# Patient Record
Sex: FEMALE | Race: WHITE | NOT HISPANIC OR LATINO | Employment: FULL TIME | URBAN - NONMETROPOLITAN AREA
[De-identification: names, ages, dates, MRNs, and addresses within clinical notes are randomized per-mention and may not be internally consistent; named-entity substitution may affect disease eponyms.]

---

## 2017-06-15 ENCOUNTER — TRANSCRIBE ORDERS (OUTPATIENT)
Dept: ADMINISTRATIVE | Facility: HOSPITAL | Age: 42
End: 2017-06-15

## 2017-06-15 DIAGNOSIS — N63.0 BREAST LUMP: Primary | ICD-10-CM

## 2017-06-21 ENCOUNTER — HOSPITAL ENCOUNTER (OUTPATIENT)
Dept: MAMMOGRAPHY | Facility: HOSPITAL | Age: 42
Discharge: HOME | End: 2017-06-21

## 2017-06-21 ENCOUNTER — HOSPITAL ENCOUNTER (OUTPATIENT)
Dept: ULTRASOUND IMAGING | Facility: HOSPITAL | Age: 42
Discharge: HOME | End: 2017-06-21
Admitting: NURSE PRACTITIONER

## 2017-06-21 DIAGNOSIS — N63.0 BREAST LUMP: ICD-10-CM

## 2017-06-21 PROCEDURE — 77066 DX MAMMO INCL CAD BI: CPT | Performed by: RADIOLOGY

## 2017-06-21 PROCEDURE — 76642 ULTRASOUND BREAST LIMITED: CPT | Performed by: RADIOLOGY

## 2017-06-21 PROCEDURE — G0204 DX MAMMO INCL CAD BI: HCPCS

## 2017-06-21 PROCEDURE — G0279 TOMOSYNTHESIS, MAMMO: HCPCS

## 2017-06-21 PROCEDURE — 77062 BREAST TOMOSYNTHESIS BI: CPT | Performed by: RADIOLOGY

## 2017-06-21 PROCEDURE — 76642 ULTRASOUND BREAST LIMITED: CPT

## 2017-06-26 ENCOUNTER — TELEPHONE (OUTPATIENT)
Dept: MAMMOGRAPHY | Facility: HOSPITAL | Age: 42
End: 2017-06-26

## 2017-06-28 ENCOUNTER — APPOINTMENT (OUTPATIENT)
Dept: ULTRASOUND IMAGING | Facility: HOSPITAL | Age: 42
End: 2017-06-28

## 2017-06-29 ENCOUNTER — HOSPITAL ENCOUNTER (OUTPATIENT)
Dept: ULTRASOUND IMAGING | Facility: HOSPITAL | Age: 42
Discharge: HOME | End: 2017-06-29
Admitting: RADIOLOGY

## 2017-06-29 DIAGNOSIS — N60.02 CYST (SOLITARY) OF BREAST, LEFT: ICD-10-CM

## 2017-06-29 PROCEDURE — 19000 PUNCTURE ASPIR CYST BREAST: CPT | Performed by: RADIOLOGY

## 2017-06-29 PROCEDURE — 76942 ECHO GUIDE FOR BIOPSY: CPT

## 2017-06-29 PROCEDURE — 76942 ECHO GUIDE FOR BIOPSY: CPT | Performed by: RADIOLOGY

## 2017-06-30 LAB
LAB AP CASE REPORT: NORMAL
Lab: NORMAL

## 2017-07-05 ENCOUNTER — TELEPHONE (OUTPATIENT)
Dept: MAMMOGRAPHY | Facility: HOSPITAL | Age: 42
End: 2017-07-05

## 2018-06-20 ENCOUNTER — HOSPITAL ENCOUNTER (OUTPATIENT)
Dept: MAMMOGRAPHY | Facility: HOSPITAL | Age: 43
Discharge: HOME | End: 2018-06-20
Admitting: FAMILY MEDICINE

## 2018-06-20 DIAGNOSIS — Z12.31 SCREENING MAMMOGRAM, ENCOUNTER FOR: ICD-10-CM

## 2018-06-20 PROCEDURE — 77063 BREAST TOMOSYNTHESIS BI: CPT

## 2018-06-20 PROCEDURE — 77067 SCR MAMMO BI INCL CAD: CPT

## 2018-06-20 PROCEDURE — 77067 SCR MAMMO BI INCL CAD: CPT | Performed by: RADIOLOGY

## 2018-06-20 PROCEDURE — 77063 BREAST TOMOSYNTHESIS BI: CPT | Performed by: RADIOLOGY

## 2019-10-24 ENCOUNTER — HOSPITAL ENCOUNTER (OUTPATIENT)
Dept: MAMMOGRAPHY | Facility: HOSPITAL | Age: 44
Discharge: HOME | End: 2019-10-24
Admitting: FAMILY MEDICINE

## 2019-10-24 DIAGNOSIS — Z12.31 SCREENING MAMMOGRAM FOR HIGH-RISK PATIENT: ICD-10-CM

## 2019-10-24 PROCEDURE — 77067 SCR MAMMO BI INCL CAD: CPT

## 2019-10-24 PROCEDURE — 77063 BREAST TOMOSYNTHESIS BI: CPT | Performed by: RADIOLOGY

## 2019-10-24 PROCEDURE — 77067 SCR MAMMO BI INCL CAD: CPT | Performed by: RADIOLOGY

## 2019-10-24 PROCEDURE — 77063 BREAST TOMOSYNTHESIS BI: CPT

## 2020-12-30 ENCOUNTER — IMMUNIZATION (OUTPATIENT)
Dept: VACCINE CLINIC | Facility: HOSPITAL | Age: 45
End: 2020-12-30

## 2020-12-30 PROCEDURE — 91300 HC SARSCOV02 VAC 30MCG/0.3ML IM: CPT | Performed by: FAMILY MEDICINE

## 2020-12-30 PROCEDURE — 0001A: CPT | Performed by: FAMILY MEDICINE

## 2021-01-20 ENCOUNTER — IMMUNIZATION (OUTPATIENT)
Dept: VACCINE CLINIC | Facility: HOSPITAL | Age: 46
End: 2021-01-20

## 2021-01-20 PROCEDURE — 91300 HC SARSCOV02 VAC 30MCG/0.3ML IM: CPT | Performed by: FAMILY MEDICINE

## 2021-01-20 PROCEDURE — 0002A: CPT | Performed by: FAMILY MEDICINE

## 2021-10-29 ENCOUNTER — TRANSCRIBE ORDERS (OUTPATIENT)
Dept: OTHER | Facility: OTHER | Age: 46
End: 2021-10-29

## 2021-10-29 ENCOUNTER — LAB (OUTPATIENT)
Dept: LAB | Facility: HOSPITAL | Age: 46
End: 2021-10-29

## 2021-10-29 DIAGNOSIS — Z01.419 ROUTINE GYNECOLOGICAL EXAMINATION: ICD-10-CM

## 2021-10-29 DIAGNOSIS — Z01.419 ROUTINE GYNECOLOGICAL EXAMINATION: Primary | ICD-10-CM

## 2021-10-29 DIAGNOSIS — R53.83 TIREDNESS: ICD-10-CM

## 2021-10-29 LAB
25(OH)D3 SERPL-MCNC: 45.7 NG/ML
ALBUMIN SERPL-MCNC: 4.4 G/DL (ref 3.5–5.2)
ALBUMIN/GLOB SERPL: 1.5 G/DL
ALP SERPL-CCNC: 44 U/L (ref 39–117)
ALT SERPL W P-5'-P-CCNC: 10 U/L (ref 1–33)
ANION GAP SERPL CALCULATED.3IONS-SCNC: 11.3 MMOL/L (ref 5–15)
AST SERPL-CCNC: 15 U/L (ref 1–32)
BASOPHILS # BLD AUTO: 0.03 10*3/MM3 (ref 0–0.2)
BASOPHILS NFR BLD AUTO: 0.4 % (ref 0–1.5)
BILIRUB SERPL-MCNC: 0.2 MG/DL (ref 0–1.2)
BUN SERPL-MCNC: 13 MG/DL (ref 6–20)
BUN/CREAT SERPL: 13.7 (ref 7–25)
CALCIUM SPEC-SCNC: 9 MG/DL (ref 8.6–10.5)
CHLORIDE SERPL-SCNC: 104 MMOL/L (ref 98–107)
CHOLEST SERPL-MCNC: 206 MG/DL (ref 0–200)
CO2 SERPL-SCNC: 22.7 MMOL/L (ref 22–29)
CREAT SERPL-MCNC: 0.95 MG/DL (ref 0.57–1)
DEPRECATED RDW RBC AUTO: 38 FL (ref 37–54)
EOSINOPHIL # BLD AUTO: 0.11 10*3/MM3 (ref 0–0.4)
EOSINOPHIL NFR BLD AUTO: 1.4 % (ref 0.3–6.2)
ERYTHROCYTE [DISTWIDTH] IN BLOOD BY AUTOMATED COUNT: 12.3 % (ref 12.3–15.4)
FOLATE SERPL-MCNC: 18.2 NG/ML (ref 4.78–24.2)
GFR SERPL CREATININE-BSD FRML MDRD: 63 ML/MIN/1.73
GLOBULIN UR ELPH-MCNC: 3 GM/DL
GLUCOSE SERPL-MCNC: 100 MG/DL (ref 65–99)
HCT VFR BLD AUTO: 37 % (ref 34–46.6)
HDLC SERPL-MCNC: 67 MG/DL (ref 40–60)
HGB BLD-MCNC: 12.3 G/DL (ref 12–15.9)
IMM GRANULOCYTES # BLD AUTO: 0.03 10*3/MM3 (ref 0–0.05)
IMM GRANULOCYTES NFR BLD AUTO: 0.4 % (ref 0–0.5)
LDLC SERPL CALC-MCNC: 121 MG/DL (ref 0–100)
LDLC/HDLC SERPL: 1.76 {RATIO}
LYMPHOCYTES # BLD AUTO: 1.74 10*3/MM3 (ref 0.7–3.1)
LYMPHOCYTES NFR BLD AUTO: 21.5 % (ref 19.6–45.3)
MCH RBC QN AUTO: 28.5 PG (ref 26.6–33)
MCHC RBC AUTO-ENTMCNC: 33.2 G/DL (ref 31.5–35.7)
MCV RBC AUTO: 85.6 FL (ref 79–97)
MONOCYTES # BLD AUTO: 0.45 10*3/MM3 (ref 0.1–0.9)
MONOCYTES NFR BLD AUTO: 5.6 % (ref 5–12)
NEUTROPHILS NFR BLD AUTO: 5.72 10*3/MM3 (ref 1.7–7)
NEUTROPHILS NFR BLD AUTO: 70.7 % (ref 42.7–76)
NRBC BLD AUTO-RTO: 0 /100 WBC (ref 0–0.2)
PLATELET # BLD AUTO: 332 10*3/MM3 (ref 140–450)
PMV BLD AUTO: 12.1 FL (ref 6–12)
POTASSIUM SERPL-SCNC: 3.9 MMOL/L (ref 3.5–5.2)
PROT SERPL-MCNC: 7.4 G/DL (ref 6–8.5)
RBC # BLD AUTO: 4.32 10*6/MM3 (ref 3.77–5.28)
SODIUM SERPL-SCNC: 138 MMOL/L (ref 136–145)
T4 FREE SERPL-MCNC: 1.24 NG/DL (ref 0.93–1.7)
TRIGL SERPL-MCNC: 104 MG/DL (ref 0–150)
TSH SERPL DL<=0.05 MIU/L-ACNC: 1.17 UIU/ML (ref 0.27–4.2)
VIT B12 BLD-MCNC: 718 PG/ML (ref 211–946)
VLDLC SERPL-MCNC: 18 MG/DL (ref 5–40)
WBC # BLD AUTO: 8.08 10*3/MM3 (ref 3.4–10.8)

## 2021-10-29 PROCEDURE — 84443 ASSAY THYROID STIM HORMONE: CPT

## 2021-10-29 PROCEDURE — 36415 COLL VENOUS BLD VENIPUNCTURE: CPT

## 2021-10-29 PROCEDURE — 82746 ASSAY OF FOLIC ACID SERUM: CPT

## 2021-10-29 PROCEDURE — 84439 ASSAY OF FREE THYROXINE: CPT

## 2021-10-29 PROCEDURE — 82607 VITAMIN B-12: CPT

## 2021-10-29 PROCEDURE — 85025 COMPLETE CBC W/AUTO DIFF WBC: CPT

## 2021-10-29 PROCEDURE — 80061 LIPID PANEL: CPT

## 2021-10-29 PROCEDURE — 80053 COMPREHEN METABOLIC PANEL: CPT

## 2021-10-29 PROCEDURE — 82306 VITAMIN D 25 HYDROXY: CPT

## 2022-08-15 ENCOUNTER — HOSPITAL ENCOUNTER (OUTPATIENT)
Dept: MAMMOGRAPHY | Facility: HOSPITAL | Age: 47
Discharge: HOME | End: 2022-08-15
Admitting: FAMILY MEDICINE

## 2022-08-15 DIAGNOSIS — Z12.31 VISIT FOR SCREENING MAMMOGRAM: ICD-10-CM

## 2022-08-15 PROCEDURE — 77063 BREAST TOMOSYNTHESIS BI: CPT | Performed by: RADIOLOGY

## 2022-08-15 PROCEDURE — 77063 BREAST TOMOSYNTHESIS BI: CPT

## 2022-08-15 PROCEDURE — 77067 SCR MAMMO BI INCL CAD: CPT

## 2022-08-15 PROCEDURE — 77067 SCR MAMMO BI INCL CAD: CPT | Performed by: RADIOLOGY

## 2022-12-20 ENCOUNTER — PREP FOR SURGERY (OUTPATIENT)
Dept: OTHER | Facility: HOSPITAL | Age: 47
End: 2022-12-20

## 2022-12-20 DIAGNOSIS — N93.9 ABNORMAL UTERINE BLEEDING (AUB): Primary | ICD-10-CM

## 2022-12-20 DIAGNOSIS — D25.9 UTERINE LEIOMYOMA: ICD-10-CM

## 2022-12-20 RX ORDER — SODIUM CHLORIDE 0.9 % (FLUSH) 0.9 %
10 SYRINGE (ML) INJECTION AS NEEDED
Status: CANCELLED | OUTPATIENT
Start: 2022-12-20

## 2022-12-20 RX ORDER — SODIUM CHLORIDE 0.9 % (FLUSH) 0.9 %
10 SYRINGE (ML) INJECTION EVERY 12 HOURS SCHEDULED
Status: CANCELLED | OUTPATIENT
Start: 2022-12-20

## 2022-12-20 RX ORDER — SODIUM CHLORIDE 9 MG/ML
40 INJECTION, SOLUTION INTRAVENOUS AS NEEDED
Status: CANCELLED | OUTPATIENT
Start: 2022-12-20

## 2022-12-27 ENCOUNTER — LAB (OUTPATIENT)
Dept: LAB | Facility: HOSPITAL | Age: 47
End: 2022-12-27
Payer: COMMERCIAL

## 2022-12-27 ENCOUNTER — PRE-ADMISSION TESTING (OUTPATIENT)
Dept: PREADMISSION TESTING | Facility: HOSPITAL | Age: 47
End: 2022-12-27
Payer: COMMERCIAL

## 2022-12-27 DIAGNOSIS — N93.9 ABNORMAL UTERINE BLEEDING (AUB): ICD-10-CM

## 2022-12-27 DIAGNOSIS — D25.9 UTERINE LEIOMYOMA: ICD-10-CM

## 2022-12-27 LAB
ABO GROUP BLD: NORMAL
BASOPHILS # BLD AUTO: 0.03 10*3/MM3 (ref 0–0.2)
BASOPHILS NFR BLD AUTO: 0.3 % (ref 0–1.5)
BLD GP AB SCN SERPL QL: NEGATIVE
DEPRECATED RDW RBC AUTO: 42 FL (ref 37–54)
EOSINOPHIL # BLD AUTO: 0.37 10*3/MM3 (ref 0–0.4)
EOSINOPHIL NFR BLD AUTO: 4.2 % (ref 0.3–6.2)
ERYTHROCYTE [DISTWIDTH] IN BLOOD BY AUTOMATED COUNT: 13 % (ref 12.3–15.4)
HCT VFR BLD AUTO: 39.6 % (ref 34–46.6)
HGB BLD-MCNC: 12.6 G/DL (ref 12–15.9)
IMM GRANULOCYTES # BLD AUTO: 0.02 10*3/MM3 (ref 0–0.05)
IMM GRANULOCYTES NFR BLD AUTO: 0.2 % (ref 0–0.5)
LYMPHOCYTES # BLD AUTO: 1.65 10*3/MM3 (ref 0.7–3.1)
LYMPHOCYTES NFR BLD AUTO: 18.5 % (ref 19.6–45.3)
MCH RBC QN AUTO: 28.3 PG (ref 26.6–33)
MCHC RBC AUTO-ENTMCNC: 31.8 G/DL (ref 31.5–35.7)
MCV RBC AUTO: 88.8 FL (ref 79–97)
MONOCYTES # BLD AUTO: 0.57 10*3/MM3 (ref 0.1–0.9)
MONOCYTES NFR BLD AUTO: 6.4 % (ref 5–12)
NEUTROPHILS NFR BLD AUTO: 6.27 10*3/MM3 (ref 1.7–7)
NEUTROPHILS NFR BLD AUTO: 70.4 % (ref 42.7–76)
NRBC BLD AUTO-RTO: 0 /100 WBC (ref 0–0.2)
PLATELET # BLD AUTO: 264 10*3/MM3 (ref 140–450)
PMV BLD AUTO: 11.7 FL (ref 6–12)
RBC # BLD AUTO: 4.46 10*6/MM3 (ref 3.77–5.28)
RH BLD: POSITIVE
SARS-COV-2 RNA RESP QL NAA+PROBE: NOT DETECTED
T&S EXPIRATION DATE: NORMAL
WBC NRBC COR # BLD: 8.91 10*3/MM3 (ref 3.4–10.8)

## 2022-12-27 PROCEDURE — 86850 RBC ANTIBODY SCREEN: CPT

## 2022-12-27 PROCEDURE — C9803 HOPD COVID-19 SPEC COLLECT: HCPCS

## 2022-12-27 PROCEDURE — 36415 COLL VENOUS BLD VENIPUNCTURE: CPT

## 2022-12-27 PROCEDURE — 86900 BLOOD TYPING SEROLOGIC ABO: CPT

## 2022-12-27 PROCEDURE — 86901 BLOOD TYPING SEROLOGIC RH(D): CPT

## 2022-12-27 PROCEDURE — U0003 INFECTIOUS AGENT DETECTION BY NUCLEIC ACID (DNA OR RNA); SEVERE ACUTE RESPIRATORY SYNDROME CORONAVIRUS 2 (SARS-COV-2) (CORONAVIRUS DISEASE [COVID-19]), AMPLIFIED PROBE TECHNIQUE, MAKING USE OF HIGH THROUGHPUT TECHNOLOGIES AS DESCRIBED BY CMS-2020-01-R: HCPCS

## 2022-12-27 PROCEDURE — 85025 COMPLETE CBC W/AUTO DIFF WBC: CPT

## 2022-12-27 NOTE — DISCHARGE INSTRUCTIONS
12/28/22   1030  ARRIVAL TIME    TAKE the following medications the morning of surgery:  All heart or blood pressure medications    HOLD all diabetic medications the morning of surgery as ordered by physician.    Please discontinue all blood thinners and anticoagulants (except aspirin) prior to surgery as per your surgeon and cardiologist instructions.  Aspirin may be continued up to the day prior to surgery.     CHLORHEXIDINE CLOTHS GIVEN WITH INSTRUCTIONS AND FORM TO RETURN TO HOSPITAL, IF APPLICABLE.    General Instructions:  Do not eat or drink after midnight: includes water, mints, or gum. You may brush your teeth.  Dental appliances that are removable must be taken out day of surgery.  Do not smoke, chew tobacco, or drink alcohol.  Bring medications in original bottles, any inhalers and if applicable your C-PAP/BI-PAP machine.  Bring any papers given to you in the doctor's office.  Wear clean comfortable clothes and socks.  Do not wear contact lenses or make-up. Bring a case for your glasses if applicable.  Bring crutches or walker if applicable.  Leave all other valuables and jewelry at home.    If you were given a blood bank ID arm band remember to bring it with you the day of surgery.    Preventing a Surgical Site Infection:  Shower the night before surgery (unless instructed other wise) using a fresh bar of anti-bacterial soap (such as Dial) and clean washcloth. Dry with a clean towel and dress in clean clothing.  For 2 to 3 days before surgery, avoid shaving with a razor near where you will have surgery because the razor can irritate skin and make it easier to develop an infection. Ask your surgeon if you will be receiving antibiotics prior to surgery.  Make sure you, your family, and all healthcare providers clear their hands with soap and water or an alcohol-based hand  before caring for you or your wound.  If at all possible, quit smoking as many days before surgery as you can.    Day of  surgery:  Upon arrival, a Pre-op nurse and Anesthesiologist will review your health history, obtain vital signs, and answer questions you may have. The only belongings needed at this time will be your home medications and if applicable your C-PAP/BI-PAP machine. If you are staying overnight your family can leave the rest of your belongings in the car and bring them to your room later. A Pre-op nurse will start an IV and you may receive medication in preparation for surgery, including something to help you relax. Your family will be able to see you in the Pre-op area. While you are in surgery your family should notify the waiting room  if they leave the waiting room area and provide a contact phone number.    Please be aware that surgery does come with discomfort. We want to make every effort to control your discomfort so please discuss any uncontrolled symptoms with your nurse. Your doctor will most likely have prescribed pain medications.    If you are going home after surgery you will receive individualized written care instructions before being discharged. A responsible adult must drive you to and from the hospital on the day of surgery and stay with you for 24 hours.    If you are staying overnight following surgery, you will be transported to your hospital room following the recovery period.  [facility] has all private rooms.    If you have any questions please call Pre-Admission Testing at [phone].  Deductibles and co-payments are collected on the day of service. Please be prepared to pay the required co-pay, deductible or deposit on the day of service as defined by your plan.    A RESPONSIBLE PERSON MUST REMAIN IN THE WAITING ROOM DURING YOUR PROCEDURE AND A RESPONSIBLE  MUST BE AVAILABLE UPON YOUR DISCHARGE.

## 2022-12-28 ENCOUNTER — APPOINTMENT (OUTPATIENT)
Dept: GENERAL RADIOLOGY | Facility: HOSPITAL | Age: 47
End: 2022-12-28
Payer: COMMERCIAL

## 2022-12-28 ENCOUNTER — ANESTHESIA (OUTPATIENT)
Dept: PERIOP | Facility: HOSPITAL | Age: 47
End: 2022-12-28
Payer: COMMERCIAL

## 2022-12-28 ENCOUNTER — HOSPITAL ENCOUNTER (OUTPATIENT)
Facility: HOSPITAL | Age: 47
Discharge: HOME | End: 2022-12-29
Attending: OBSTETRICS & GYNECOLOGY | Admitting: OBSTETRICS & GYNECOLOGY
Payer: COMMERCIAL

## 2022-12-28 ENCOUNTER — ANESTHESIA EVENT (OUTPATIENT)
Dept: PERIOP | Facility: HOSPITAL | Age: 47
End: 2022-12-28
Payer: COMMERCIAL

## 2022-12-28 DIAGNOSIS — N93.9 ABNORMAL UTERINE BLEEDING (AUB): ICD-10-CM

## 2022-12-28 DIAGNOSIS — D25.9 UTERINE LEIOMYOMA: ICD-10-CM

## 2022-12-28 DIAGNOSIS — D25.9 UTERINE FIBROID: ICD-10-CM

## 2022-12-28 LAB
ABO GROUP BLD: NORMAL
B-HCG UR QL: NEGATIVE
EXPIRATION DATE: NORMAL
INTERNAL NEGATIVE CONTROL: NEGATIVE
INTERNAL POSITIVE CONTROL: POSITIVE
Lab: NORMAL
RH BLD: POSITIVE

## 2022-12-28 PROCEDURE — G0378 HOSPITAL OBSERVATION PER HR: HCPCS

## 2022-12-28 PROCEDURE — 25010000002 ONDANSETRON PER 1 MG: Performed by: NURSE ANESTHETIST, CERTIFIED REGISTERED

## 2022-12-28 PROCEDURE — 25010000002 NEOSTIGMINE 10 MG/10ML SOLUTION: Performed by: NURSE ANESTHETIST, CERTIFIED REGISTERED

## 2022-12-28 PROCEDURE — 25010000002 DEXAMETHASONE PER 1 MG: Performed by: NURSE ANESTHETIST, CERTIFIED REGISTERED

## 2022-12-28 PROCEDURE — 88307 TISSUE EXAM BY PATHOLOGIST: CPT

## 2022-12-28 PROCEDURE — 25010000002 PROPOFOL 200 MG/20ML EMULSION: Performed by: NURSE ANESTHETIST, CERTIFIED REGISTERED

## 2022-12-28 PROCEDURE — 25010000002 FENTANYL CITRATE (PF) 50 MCG/ML SOLUTION: Performed by: NURSE ANESTHETIST, CERTIFIED REGISTERED

## 2022-12-28 PROCEDURE — 86900 BLOOD TYPING SEROLOGIC ABO: CPT

## 2022-12-28 PROCEDURE — 25010000002 ROPIVACAINE PER 1 MG: Performed by: ANESTHESIOLOGY

## 2022-12-28 PROCEDURE — 86901 BLOOD TYPING SEROLOGIC RH(D): CPT

## 2022-12-28 PROCEDURE — 25010000002 MIDAZOLAM PER 1 MG: Performed by: NURSE ANESTHETIST, CERTIFIED REGISTERED

## 2022-12-28 PROCEDURE — 81025 URINE PREGNANCY TEST: CPT | Performed by: ANESTHESIOLOGY

## 2022-12-28 PROCEDURE — 25010000002 CEFOXITIN PER 1 G: Performed by: NURSE PRACTITIONER

## 2022-12-28 PROCEDURE — 25010000002 BUPRENORPHINE PER 0.1 MG: Performed by: ANESTHESIOLOGY

## 2022-12-28 PROCEDURE — 25010000002 KETOROLAC TROMETHAMINE PER 15 MG: Performed by: NURSE ANESTHETIST, CERTIFIED REGISTERED

## 2022-12-28 DEVICE — KNOTLESS TISSUE CONTROL DEVICE, UNDYED UNIDIRECTIONAL (ANTIBACTERIAL) SYNTHETIC ABSORBABLE DEVICE
Type: IMPLANTABLE DEVICE | Site: ABDOMEN | Status: FUNCTIONAL
Brand: STRATAFIX

## 2022-12-28 RX ORDER — SODIUM CHLORIDE, SODIUM LACTATE, POTASSIUM CHLORIDE, CALCIUM CHLORIDE 600; 310; 30; 20 MG/100ML; MG/100ML; MG/100ML; MG/100ML
INJECTION, SOLUTION INTRAVENOUS CONTINUOUS PRN
Status: DISCONTINUED | OUTPATIENT
Start: 2022-12-28 | End: 2022-12-28 | Stop reason: SURG

## 2022-12-28 RX ORDER — SODIUM CHLORIDE 9 MG/ML
INJECTION, SOLUTION INTRAVENOUS AS NEEDED
Status: DISCONTINUED | OUTPATIENT
Start: 2022-12-28 | End: 2022-12-28 | Stop reason: HOSPADM

## 2022-12-28 RX ORDER — ONDANSETRON 2 MG/ML
INJECTION INTRAMUSCULAR; INTRAVENOUS AS NEEDED
Status: DISCONTINUED | OUTPATIENT
Start: 2022-12-28 | End: 2022-12-28 | Stop reason: SURG

## 2022-12-28 RX ORDER — PROPOFOL 10 MG/ML
INJECTION, EMULSION INTRAVENOUS AS NEEDED
Status: DISCONTINUED | OUTPATIENT
Start: 2022-12-28 | End: 2022-12-28 | Stop reason: SURG

## 2022-12-28 RX ORDER — HYDROMORPHONE HYDROCHLORIDE 1 MG/ML
0.5 INJECTION, SOLUTION INTRAMUSCULAR; INTRAVENOUS; SUBCUTANEOUS
Status: DISCONTINUED | OUTPATIENT
Start: 2022-12-28 | End: 2022-12-29 | Stop reason: HOSPADM

## 2022-12-28 RX ORDER — DEXAMETHASONE SODIUM PHOSPHATE 4 MG/ML
INJECTION, SOLUTION INTRA-ARTICULAR; INTRALESIONAL; INTRAMUSCULAR; INTRAVENOUS; SOFT TISSUE AS NEEDED
Status: DISCONTINUED | OUTPATIENT
Start: 2022-12-28 | End: 2022-12-28 | Stop reason: SURG

## 2022-12-28 RX ORDER — IBUPROFEN 800 MG/1
800 TABLET ORAL 3 TIMES DAILY
Status: DISCONTINUED | OUTPATIENT
Start: 2022-12-28 | End: 2022-12-29 | Stop reason: HOSPADM

## 2022-12-28 RX ORDER — ONDANSETRON 4 MG/1
4 TABLET, FILM COATED ORAL EVERY 6 HOURS PRN
Status: DISCONTINUED | OUTPATIENT
Start: 2022-12-28 | End: 2022-12-29 | Stop reason: HOSPADM

## 2022-12-28 RX ORDER — SODIUM CHLORIDE, SODIUM LACTATE, POTASSIUM CHLORIDE, CALCIUM CHLORIDE 600; 310; 30; 20 MG/100ML; MG/100ML; MG/100ML; MG/100ML
100 INJECTION, SOLUTION INTRAVENOUS ONCE AS NEEDED
Status: DISCONTINUED | OUTPATIENT
Start: 2022-12-28 | End: 2022-12-28 | Stop reason: HOSPADM

## 2022-12-28 RX ORDER — SODIUM CHLORIDE 0.9 % (FLUSH) 0.9 %
10 SYRINGE (ML) INJECTION EVERY 12 HOURS SCHEDULED
Status: DISCONTINUED | OUTPATIENT
Start: 2022-12-28 | End: 2022-12-28 | Stop reason: HOSPADM

## 2022-12-28 RX ORDER — MAGNESIUM HYDROXIDE 1200 MG/15ML
LIQUID ORAL AS NEEDED
Status: DISCONTINUED | OUTPATIENT
Start: 2022-12-28 | End: 2022-12-28 | Stop reason: HOSPADM

## 2022-12-28 RX ORDER — LIDOCAINE HYDROCHLORIDE 20 MG/ML
INJECTION, SOLUTION EPIDURAL; INFILTRATION; INTRACAUDAL; PERINEURAL AS NEEDED
Status: DISCONTINUED | OUTPATIENT
Start: 2022-12-28 | End: 2022-12-28 | Stop reason: SURG

## 2022-12-28 RX ORDER — SODIUM CHLORIDE, SODIUM LACTATE, POTASSIUM CHLORIDE, CALCIUM CHLORIDE 600; 310; 30; 20 MG/100ML; MG/100ML; MG/100ML; MG/100ML
125 INJECTION, SOLUTION INTRAVENOUS CONTINUOUS
Status: DISCONTINUED | OUTPATIENT
Start: 2022-12-28 | End: 2022-12-29 | Stop reason: HOSPADM

## 2022-12-28 RX ORDER — MEPERIDINE HYDROCHLORIDE 25 MG/ML
12.5 INJECTION INTRAMUSCULAR; INTRAVENOUS; SUBCUTANEOUS
Status: DISCONTINUED | OUTPATIENT
Start: 2022-12-28 | End: 2022-12-28 | Stop reason: HOSPADM

## 2022-12-28 RX ORDER — OXYCODONE HYDROCHLORIDE AND ACETAMINOPHEN 5; 325 MG/1; MG/1
1 TABLET ORAL ONCE AS NEEDED
Status: DISCONTINUED | OUTPATIENT
Start: 2022-12-28 | End: 2022-12-28 | Stop reason: HOSPADM

## 2022-12-28 RX ORDER — GLYCOPYRROLATE 0.2 MG/ML
INJECTION INTRAMUSCULAR; INTRAVENOUS AS NEEDED
Status: DISCONTINUED | OUTPATIENT
Start: 2022-12-28 | End: 2022-12-28 | Stop reason: SURG

## 2022-12-28 RX ORDER — ONDANSETRON 2 MG/ML
4 INJECTION INTRAMUSCULAR; INTRAVENOUS AS NEEDED
Status: DISCONTINUED | OUTPATIENT
Start: 2022-12-28 | End: 2022-12-28 | Stop reason: HOSPADM

## 2022-12-28 RX ORDER — NALOXONE HCL 0.4 MG/ML
0.1 VIAL (ML) INJECTION
Status: DISCONTINUED | OUTPATIENT
Start: 2022-12-28 | End: 2022-12-29 | Stop reason: HOSPADM

## 2022-12-28 RX ORDER — SODIUM CHLORIDE, SODIUM LACTATE, POTASSIUM CHLORIDE, CALCIUM CHLORIDE 600; 310; 30; 20 MG/100ML; MG/100ML; MG/100ML; MG/100ML
125 INJECTION, SOLUTION INTRAVENOUS ONCE
Status: COMPLETED | OUTPATIENT
Start: 2022-12-28 | End: 2022-12-28

## 2022-12-28 RX ORDER — KETOROLAC TROMETHAMINE 30 MG/ML
30 INJECTION, SOLUTION INTRAMUSCULAR; INTRAVENOUS EVERY 6 HOURS PRN
Status: DISCONTINUED | OUTPATIENT
Start: 2022-12-28 | End: 2022-12-29 | Stop reason: HOSPADM

## 2022-12-28 RX ORDER — FAMOTIDINE 10 MG/ML
INJECTION, SOLUTION INTRAVENOUS AS NEEDED
Status: DISCONTINUED | OUTPATIENT
Start: 2022-12-28 | End: 2022-12-28 | Stop reason: SURG

## 2022-12-28 RX ORDER — OXYCODONE HYDROCHLORIDE AND ACETAMINOPHEN 5; 325 MG/1; MG/1
1 TABLET ORAL EVERY 4 HOURS PRN
Status: DISCONTINUED | OUTPATIENT
Start: 2022-12-28 | End: 2022-12-29 | Stop reason: HOSPADM

## 2022-12-28 RX ORDER — FENTANYL CITRATE 50 UG/ML
50 INJECTION, SOLUTION INTRAMUSCULAR; INTRAVENOUS
Status: DISCONTINUED | OUTPATIENT
Start: 2022-12-28 | End: 2022-12-28 | Stop reason: HOSPADM

## 2022-12-28 RX ORDER — METOCLOPRAMIDE HYDROCHLORIDE 5 MG/ML
10 INJECTION INTRAMUSCULAR; INTRAVENOUS EVERY 6 HOURS PRN
Status: DISCONTINUED | OUTPATIENT
Start: 2022-12-28 | End: 2022-12-29 | Stop reason: HOSPADM

## 2022-12-28 RX ORDER — IPRATROPIUM BROMIDE AND ALBUTEROL SULFATE 2.5; .5 MG/3ML; MG/3ML
3 SOLUTION RESPIRATORY (INHALATION) ONCE AS NEEDED
Status: DISCONTINUED | OUTPATIENT
Start: 2022-12-28 | End: 2022-12-28 | Stop reason: HOSPADM

## 2022-12-28 RX ORDER — SODIUM CHLORIDE 0.9 % (FLUSH) 0.9 %
10 SYRINGE (ML) INJECTION AS NEEDED
Status: DISCONTINUED | OUTPATIENT
Start: 2022-12-28 | End: 2022-12-28 | Stop reason: HOSPADM

## 2022-12-28 RX ORDER — BUPRENORPHINE HYDROCHLORIDE 0.32 MG/ML
INJECTION INTRAMUSCULAR; INTRAVENOUS
Status: COMPLETED | OUTPATIENT
Start: 2022-12-28 | End: 2022-12-28

## 2022-12-28 RX ORDER — NEOSTIGMINE METHYLSULFATE 1 MG/ML
INJECTION, SOLUTION INTRAVENOUS AS NEEDED
Status: DISCONTINUED | OUTPATIENT
Start: 2022-12-28 | End: 2022-12-28 | Stop reason: SURG

## 2022-12-28 RX ORDER — DEXAMETHASONE SODIUM PHOSPHATE 4 MG/ML
INJECTION, SOLUTION INTRA-ARTICULAR; INTRALESIONAL; INTRAMUSCULAR; INTRAVENOUS; SOFT TISSUE
Status: COMPLETED | OUTPATIENT
Start: 2022-12-28 | End: 2022-12-28

## 2022-12-28 RX ORDER — SODIUM CHLORIDE 9 MG/ML
40 INJECTION, SOLUTION INTRAVENOUS AS NEEDED
Status: DISCONTINUED | OUTPATIENT
Start: 2022-12-28 | End: 2022-12-28 | Stop reason: HOSPADM

## 2022-12-28 RX ORDER — ROCURONIUM BROMIDE 10 MG/ML
INJECTION, SOLUTION INTRAVENOUS AS NEEDED
Status: DISCONTINUED | OUTPATIENT
Start: 2022-12-28 | End: 2022-12-28 | Stop reason: SURG

## 2022-12-28 RX ORDER — MIDAZOLAM HYDROCHLORIDE 1 MG/ML
1 INJECTION INTRAMUSCULAR; INTRAVENOUS
Status: DISCONTINUED | OUTPATIENT
Start: 2022-12-28 | End: 2022-12-28 | Stop reason: HOSPADM

## 2022-12-28 RX ORDER — FENTANYL CITRATE 50 UG/ML
INJECTION, SOLUTION INTRAMUSCULAR; INTRAVENOUS AS NEEDED
Status: DISCONTINUED | OUTPATIENT
Start: 2022-12-28 | End: 2022-12-28 | Stop reason: SURG

## 2022-12-28 RX ORDER — ROPIVACAINE HYDROCHLORIDE 5 MG/ML
INJECTION, SOLUTION EPIDURAL; INFILTRATION; PERINEURAL
Status: COMPLETED | OUTPATIENT
Start: 2022-12-28 | End: 2022-12-28

## 2022-12-28 RX ORDER — MIDAZOLAM HYDROCHLORIDE 1 MG/ML
INJECTION INTRAMUSCULAR; INTRAVENOUS AS NEEDED
Status: DISCONTINUED | OUTPATIENT
Start: 2022-12-28 | End: 2022-12-28 | Stop reason: SURG

## 2022-12-28 RX ORDER — KETOROLAC TROMETHAMINE 30 MG/ML
30 INJECTION, SOLUTION INTRAMUSCULAR; INTRAVENOUS EVERY 6 HOURS PRN
Status: COMPLETED | OUTPATIENT
Start: 2022-12-28 | End: 2022-12-28

## 2022-12-28 RX ORDER — ONDANSETRON 2 MG/ML
4 INJECTION INTRAMUSCULAR; INTRAVENOUS EVERY 6 HOURS PRN
Status: DISCONTINUED | OUTPATIENT
Start: 2022-12-28 | End: 2022-12-29 | Stop reason: HOSPADM

## 2022-12-28 RX ADMIN — FENTANYL CITRATE 50 MCG: 50 INJECTION, SOLUTION INTRAMUSCULAR; INTRAVENOUS at 10:39

## 2022-12-28 RX ADMIN — IBUPROFEN 800 MG: 800 TABLET, FILM COATED ORAL at 21:27

## 2022-12-28 RX ADMIN — LIDOCAINE HYDROCHLORIDE 40 MG: 20 INJECTION, SOLUTION EPIDURAL; INFILTRATION; INTRACAUDAL; PERINEURAL at 10:39

## 2022-12-28 RX ADMIN — CEFOXITIN 2 G: 2 INJECTION, POWDER, FOR SOLUTION INTRAVENOUS at 10:45

## 2022-12-28 RX ADMIN — NEOSTIGMINE METHYLSULFATE 3 MG: 0.5 INJECTION INTRAVENOUS at 11:28

## 2022-12-28 RX ADMIN — ROCURONIUM BROMIDE 25 MG: 50 INJECTION, SOLUTION INTRAVENOUS at 10:39

## 2022-12-28 RX ADMIN — DEXAMETHASONE SODIUM PHOSPHATE 4 MG: 4 INJECTION, SOLUTION INTRA-ARTICULAR; INTRALESIONAL; INTRAMUSCULAR; INTRAVENOUS; SOFT TISSUE at 10:50

## 2022-12-28 RX ADMIN — SODIUM CHLORIDE, POTASSIUM CHLORIDE, SODIUM LACTATE AND CALCIUM CHLORIDE 125 ML/HR: 600; 310; 30; 20 INJECTION, SOLUTION INTRAVENOUS at 10:18

## 2022-12-28 RX ADMIN — FENTANYL CITRATE 50 MCG: 50 INJECTION, SOLUTION INTRAMUSCULAR; INTRAVENOUS at 10:44

## 2022-12-28 RX ADMIN — SODIUM CHLORIDE, POTASSIUM CHLORIDE, SODIUM LACTATE AND CALCIUM CHLORIDE: 600; 310; 30; 20 INJECTION, SOLUTION INTRAVENOUS at 11:27

## 2022-12-28 RX ADMIN — DEXAMETHASONE SODIUM PHOSPHATE 8 MG: 4 INJECTION, SOLUTION INTRA-ARTICULAR; INTRALESIONAL; INTRAMUSCULAR; INTRAVENOUS; SOFT TISSUE at 10:44

## 2022-12-28 RX ADMIN — ROPIVACAINE HYDROCHLORIDE 180 MG: 5 INJECTION, SOLUTION EPIDURAL; INFILTRATION; PERINEURAL at 10:44

## 2022-12-28 RX ADMIN — KETOROLAC TROMETHAMINE 30 MG: 30 INJECTION, SOLUTION INTRAMUSCULAR; INTRAVENOUS at 11:56

## 2022-12-28 RX ADMIN — BUPRENORPHINE HYDROCHLORIDE 0.3 MG: 0.32 INJECTION INTRAMUSCULAR; INTRAVENOUS at 10:44

## 2022-12-28 RX ADMIN — SODIUM CHLORIDE, POTASSIUM CHLORIDE, SODIUM LACTATE AND CALCIUM CHLORIDE: 600; 310; 30; 20 INJECTION, SOLUTION INTRAVENOUS at 10:35

## 2022-12-28 RX ADMIN — FAMOTIDINE 20 MG: 10 INJECTION, SOLUTION INTRAVENOUS at 10:35

## 2022-12-28 RX ADMIN — MIDAZOLAM 2 MG: 1 INJECTION INTRAMUSCULAR; INTRAVENOUS at 10:35

## 2022-12-28 RX ADMIN — SODIUM CHLORIDE, POTASSIUM CHLORIDE, SODIUM LACTATE AND CALCIUM CHLORIDE 125 ML/HR: 600; 310; 30; 20 INJECTION, SOLUTION INTRAVENOUS at 16:56

## 2022-12-28 RX ADMIN — PROPOFOL 150 MG: 10 INJECTION, EMULSION INTRAVENOUS at 10:39

## 2022-12-28 RX ADMIN — ONDANSETRON 4 MG: 2 INJECTION INTRAMUSCULAR; INTRAVENOUS at 10:50

## 2022-12-28 RX ADMIN — GLYCOPYRROLATE 0.4 MG: 0.2 INJECTION, SOLUTION INTRAMUSCULAR; INTRAVENOUS at 11:28

## 2022-12-28 NOTE — OP NOTE
TOTAL LAPAROSCOPIC HYSTERECTOMY WITH DAVINCI ROBOT  Procedure Note    Indira Greco  12/28/2022    Pre-op Diagnosis:   Pelvic pain  Menorrhagia  Large fibroid uterus      Post-op Diagnosis:     Pelvic pain  Menorrhagia  Large fibroid uterus  Left ovarian cyst    Procedure(s):  Total laparoscopic hysterectomy with left salpingo-oophorectomy and right salpingectomy    Surgeon(s):  Kamaljit Barr DO    Anesthesia: General with Block    Estimated Blood Loss: minimal    Specimens:                Order Name Source Comment Collection Info Order Time   ABO/RH SPECIMEN VERIFICATION PREOP   Collected By: Maria Fernanda Sanchez 12/28/2022  9:51 AM     Release to patient   Routine Release        TISSUE EXAM, [facility] (SUDHE[facility], [facility], [facility]) Uterus, Cervix, Bilateral Fallopian Tubes   Collected By: Kamaljit Barr DO 12/28/2022 11:03 AM     How many specimens?   1          Release to patient   Routine Release              Procedure:  The patient was taken to the operating room after informed written consent was obtained. General anesthesia was induced and the patient was placed in the dorsal lithotomy position. The patient was sterily prepared and draped and a Rivas catheter was placed into the bladder. Next the BRUCE manipulator was placed into the uterus with the KOH cup fitting snugly around the cervix. Once this was in place the surgeon was regloved and attention was made to the abdomen.     We made a 1 cm incision above the umbilicus. We inserted an OPTi view trocar into the abdomen under direct visualization using the laparoscope. Next we placed 8mm robotic trochars in the left lower quadrant and another one in the right lower quadrant we placed another trocar between the right-sided port and the midline port. All trochars used were 8mm robotic trochars. We then placed the patient in Trendelenburg and docked the robot. The remainder of the procedure was done under robotic guidance.    First we picked up the left  fallopian tube and cauterized under the infundibulopelvic ligament using the vessel sealer extend.  We took the left ovary because there was a complicated appearing cyst.  We took the same dissection across the broad ligament and then went across the utero-ovarian ligament and the round ligament. We did all the cauterizing using the PK forceps and cutting using monopolar scissors. We then took the dissection to the point where the broad ligament divided anteriorly and posteriorly. Anteriorly we took the dissection across to make a bladder flap. We took it posteriorly as well skeletonizing the uterine artery. We then doubly cauterized and cut the uterine artery.  We did the same procedure on the right side but we did leave the right ovary.  There was a simple cyst that we removed.  We then took the bladder down using sharp dissection and the monopolar scissors. We then took serial bites on each side of the uterus taking the cardinal and the uterosacral ligaments. Once we got all the attachments of the uterus down we made a colpotomy anteriorly and carried it all away around. We then delivered the specimen into the vagina and left it there to keep the pneumoperitoneum. Next we made sure everything was hemostatic. We closed the vagina using a 2-0 monocryl V lock suture making sure to get good bites of the vaginal cuff angles and attaching them to the uterosacral ligaments bilaterally. We then ran the rest of the vaginal cuff taking 1 cm bites of vagina. We then re-approximated the peritoneum over the vaginal cuff using the same suture. We irrigated once again and made sure everything was hemostatic. We them placed some Marcaine into the pelvis for postoperative pain control. We then surveyed the remainder of the abdomen and pelvis and it was grossly normal. We removed the CO2 gas and closed the skin incision with a 4-0 monocryl and placed surgical adhesive on the skin.    Findings: There was a complex appearing left  ovarian cyst.  There was a simple right ovarian cyst.    Complications: none    Grafts or Implants: NA    Kamaljit Barr, DO     Date: 12/28/2022  Time: 12:12 EST

## 2022-12-28 NOTE — ANESTHESIA POSTPROCEDURE EVALUATION
Patient: Indira Greco    Procedure Summary     Date: 12/28/22 Room / Location: [facility] OR  /  [facility] OR    Anesthesia Start: 1035 Anesthesia Stop: 1141    Procedure: TOTAL LAPAROSCOPIC HYSTERECTOMY POSSIBLE BILATERAL SALPINGectomy, left  OOPHORECTOMY WITH DAVINCI ROBOT (Abdomen) Diagnosis: (D25.9)    Surgeons: Kamaljit Barr DO Provider: Kentno Burns MD    Anesthesia Type: general with block ASA Status: 2          Anesthesia Type: general with block    Vitals  Vitals Value Taken Time   /65 12/28/22 1202   Temp 97.1 °F (36.2 °C) 12/28/22 1142   Pulse 92 12/28/22 1202   Resp 17 12/28/22 1202   SpO2 100 % 12/28/22 1202           Post Anesthesia Care and Evaluation    Patient location during evaluation: PHASE II  Patient participation: complete - patient participated  Level of consciousness: awake and alert  Pain score: 1  Pain management: adequate    Airway patency: patent  Anesthetic complications: No anesthetic complications  PONV Status: controlled  Cardiovascular status: acceptable  Respiratory status: acceptable  Hydration status: acceptable

## 2022-12-28 NOTE — PLAN OF CARE
Goal Outcome Evaluation:  Plan of Care Reviewed With: patient        Progress: improving  Outcome Evaluation: Patient recieved to unit from PACU. Lap sites x4 clean and well approximated. Scant spotting noted to peripad. Rivas catheter maintained. Pain managed at this time. Pt tolerating few bites of regular food. LR infusing at 125 mL/hr. Spouse at bedside. No acute changes. VSS.

## 2022-12-28 NOTE — H&P
This 47 year old patient presents for Pre-op.      History of Present Illness:  1.  Pre-op   Additional information: She is here for preop.  We are planning on doing a total laparoscopic hysterectomy with bilateral salpingectomy.  She would like to retain her ovaries if they are normal.  We discussed the procedure risks and benefits in detail.                Screening Tools  Other Screenings  Encounter Date Performed Date Screening Tool Score Severity/ Interpretation MDD Classification Scanned Document   12/27/2022 12/27/2022 Patient Health Questionnaire (PHQ-2) 0 Further testing is not required         Gynecologic History  12/27/2022 11:10 AM  Date of last Pap: 08/09/2021.    Obstetric History  Not currently pregnant.   Past Systemic History    Medical History  (Reviewed,updated)    Surgical History/Management  (Reviewed,updated)  Management Date Comments   D&C 2004      Diagnostics History  Status Study Ordered Completed Interpretation  Result / Report   completed MAMMO SCREENING BI, (2 VIEWS) INCL CAD  08/15/2022      completed MAMMOGRAM DIAGNOSTIC UNILATERAL 06/15/2017 06/30/2017   DOS: 06/21/2017   result received PAP, liquid based 10/03/2016  See module     ordered TRANSVAGINAL US, NON-OB 09/21/2022       result received TRANSVAGINAL US, NON-OB 10/10/2022 10/10/2022 See module     completed US Exam Breast Complete Left 06/15/2017 06/30/2017   DOS: 06/21/17     Pap Result, HPV Detail and Treatment Performed  Done Pap and HPV/Treatment Pap Results HPV Details Treatment Results Comments   08/09/2021 Pap Performed See module          Problem List  Problem List reviewed.   No active problems      Patient Status   Completed with information received for patient transitioning into care.   Completed with information received for patient in a summary of care record.     Medication Reconciliation  Medications reconciled today.  Patient is on no medications.      Allergies  Ingredient Reaction (Severity) Medication Name  Comment   MINOCYCLINE HCL Anaphylaxis MINOCIN          Family History    (Reviewed, updated)  Relationship Family Member Name  Age at Death Condition Onset Age Cause of Death   Mother    hypertension  N   M    Social History:  (Reviewed, updated)  Tobacco use reviewed.  The patient is right-handed.    Preferred language is English.    MARITAL STATUS/FAMILY/SOCIAL SUPPORT  Currently .    Tobacco use status: Current non-smoker.  Smoking status: Never smoker.    SMOKING STATUS  Type Smoking Status Usage Per Day Years Used Pack Years Total Pack Years    Never smoker         TOBACCO CESSATION INFORMATION  Date Counseled By Order Status Description Code Tobacco Cessation Information   2019 [facility] Tobacco cessation counseling completed   Smoking cessation education (procedure)   2019  Tobacco cessation counseling ordered      2020  Tobacco cessation counseling ordered        VAPING USE  Screened for vaping? Yes  Status: Not a current user  Vaping cessation discussed: No    TOBACCO/VAPING EXPOSURE  No passive vaping exposure.  No passive smoke exposure.    Comments: Not exposed  ALCOHOL  There is no history of alcohol use.   CAFFEINE  The patient uses caffeine: coffee and tea. - 1 cup a day.  LIFESTYLE  DIET  high calorie.  PETS   dogs.    Jain/SPIRITUAL  Patient agrees to transfusion.         Review of Systems  Review of Systems  System Neg/Pos Details   Constitutional Negative Chills, Fatigue, Fever, Malaise, Night sweats, Weight gain and Weight loss.   Cardio Negative Chest pain, Claudication, Edema and Irregular heartbeat/palpitations.   Neuro Negative Dizziness, Extremity weakness, Gait disturbance, Headache, Memory impairment, Numbness in extremity, Seizures and Tremors.     Vital Signs     Time BP mm/Hg Pulse /min Resp /min Temp F Ht ft Ht in Ht cm Wt lb Wt kg BMI kg/m2 BSA m2 O2 Sat%   11:05 /75 93 18 96.9 5 4 162.56 137.8 62.505 23.65 1.68 98     Measured By  Time  Measured by   11:05 AM Deanna Mejias       Screening Summary  The following were reviewed: tobacco use, alcohol use, caffeine use, drugs of abuse and date of last pap        Physical Exam  Exam Findings Details   Constitutional Normal Well developed.   Respiratory Normal Inspection - Normal. Auscultation - Normal. Effort - Normal.   Cardiovascular Normal Rhythm - Regular. Extra sounds - None. Murmurs - None.   Abdomen Normal Anterior palpation -  No rebound. No abdominal tenderness. No hepatic enlargement. No hernia.   Psychiatric Normal Orientation - Oriented to time, place, person & situation. Appropriate mood and affect.       Completed Orders (This Visit)  Order Details Reason Side Interpretation Result Additional Info Initial Treatment Date Region   Patient Health Questionnaire (PHQ-2)    Further testing is not required 0      Pre-Visit Planning             Assessment/Plan  # Detail Type Description    1. Assessment Pelvic pain in female (R10.2).         2. Assessment Menorrhagia with regular cycle (N92.0).         3. Assessment Uterine leiomyoma, unspecified location (D25.9).         4. Other Orders Orders not associated to today's assessments.    Plan Orders The patient had the following order(s) completed today: Patient Health Questionnaire (PHQ-2). Interpretation: Further testing is not required, Result details: 0.            Diagnostic History Entered Today  Performed Study Interpretation Result   12/27/2022 Pre-Visit Planning       Clinical Guidelines (Reviewed, updated)  Guidelines Status Due Action Last Addressed Comments   Depression screening completed 12/27/2023 Completed on 12/27/2022 12/27/2022    HPV completed 09/22/2027 Completed on 09/22/2022 09/22/2022    Mammogram completed 11/30/2024 Completed on 08/15/2022 08/15/2022    PAP completed 09/22/2025 Completed on 09/22/2022 09/22/2022    Pap/HPV testing completed 09/22/2027 Completed on 09/22/2022 09/22/2022    Pre-Visit Planning completed  01/03/2023 Completed on 12/27/2022 12/27/2022 annual exam         Patient Education  # Patient Education   1. A Healthy Lifestyle: Care Instructions       Counseling / Educational Factors  Counseling / educational factors reviewed.

## 2022-12-28 NOTE — ANESTHESIA PREPROCEDURE EVALUATION
Anesthesia Evaluation     Patient summary reviewed and Nursing notes reviewed   no history of anesthetic complications:  NPO Solid Status: > 8 hours  NPO Liquid Status: > 8 hours           Airway   Mallampati: I  TM distance: >3 FB  Neck ROM: full  Dental          Pulmonary - negative pulmonary ROS    breath sounds clear to auscultation  Cardiovascular   Exercise tolerance: good (4-7 METS)    Rhythm: regular  Rate: normal    (+) hyperlipidemia,       Neuro/Psych- negative ROS  GI/Hepatic/Renal/Endo - negative ROS     Musculoskeletal (-) negative ROS    Abdominal     Abdomen: soft.   Substance History - negative use     OB/GYN negative ob/gyn ROS         Other - negative ROS                       Anesthesia Plan    ASA 2     general with block     intravenous induction     Anesthetic plan, risks, benefits, and alternatives have been provided, discussed and informed consent has been obtained with: patient.    Use of blood products discussed with consented to blood products.   Plan discussed with CRNA.        CODE STATUS:

## 2022-12-28 NOTE — ANESTHESIA PROCEDURE NOTES
"Peripheral Block      Patient reassessed immediately prior to procedure    Patient location during procedure: OR  Start time: 12/28/2022 10:44 AM  Stop time: 12/28/2022 2:48 PM  Reason for block: at surgeon's request and post-op pain management  Performed by  CRNA/CAA: Audelia Blue CRNA  Preanesthetic Checklist  Completed: patient identified, IV checked, site marked, risks and benefits discussed, surgical consent, monitors and equipment checked, pre-op evaluation and timeout performed  Prep:  Pt Position: supine  Sterile barriers:cap, gloves, sterile barriers and mask  Prep: ChloraPrep  Patient monitoring: blood pressure monitoring, continuous pulse oximetry and EKG  Procedure    Nursing cardiac assessment comments yes: Sedation, GA, Spinal,Epidural   Performed under: general  Guidance:ultrasound guided    ULTRASOUND INTERPRETATION.  Using ultrasound guidance a 20 G (20g 4\" Stimuplex) gauge needle was placed in close proximity to the nerve, at which point, under ultrasound guidance anesthetic was injected in the area of the nerve and spread of the anesthesia was seen on ultrasound in close proximity thereto.  There were no abnormalities seen on ultrasound; a digital image was taken; and the patient tolerated the procedure with no complications. Images:still images obtained    Laterality:Bilateral  Block Type:TAP  Injection Technique:single-shot  Needle Type:short-bevel  Needle Gauge:20 G  Resistance on Injection: none    Medications Used: ropivacaine (NAROPIN) injection 0.5 % - Peripheral Nerve   180 mg - 12/28/2022 10:44:00 AM  dexamethasone (DECADRON) injection - Injection   8 mg - 12/28/2022 10:44:00 AM  buprenorphine (BUPRENEX) injection - Injection   0.3 mg - 12/28/2022 10:44:00 AM      Medications  Preservative Free Saline:20ml  Comment:Block Injection:  Total volume divided equally between all 4 injection sites      Post Assessment  Injection Assessment: negative aspiration for heme, incremental injection " and no paresthesia on injection  Patient Tolerance:comfortable throughout block  Complications:no  Additional Notes  The pt was in the supine position under general anesthesia    Under Ultrasound guidance, a BBraun 4inch 360 degree needle was advanced with Normal Saline hydro dissection of tissue.  The Internal Oblique and Transversus Abdominus muscles where visualized.  At or before the aponeurosis of Internal Oblique, local anesthetic spread was visualized in the Transversus Abdominus Plane. Injection was made incrementally with aspiration every 5 mls.  There was no  intravascular injection,  injection pressure was normal, there was no neural injection, and the procedure was completed without difficulty. The same procedure was completed for left and right sided lateral tap blocks.    Under Ultrasound guidance, a [place] 4inch 360 degree needle was advanced with Normal Saline hydro dissection of tissue.  The Rectus and Transversus Abdominus muscles where visualized.  The needle tip was placed between the Transversus Abdominus and rectus abdominus, local anesthetic spread was visualized in the Transversus Abdominus Plane. Injection was made incrementally with aspiration every 5 mls.  There was no  intravascular injection,  injection pressure was normal, there was no neural injection, and the procedure was completed without difficulty. The same procedure was completed for left and right sided subcostal tap blocks. Thank You.

## 2022-12-28 NOTE — ANESTHESIA PROCEDURE NOTES
Airway  Urgency: elective    Date/Time: 12/28/2022 10:42 AM  Airway not difficult    General Information and Staff    Patient location during procedure: OR    Indications and Patient Condition  Indications for airway management: airway protection    Preoxygenated: yes  Mask difficulty assessment: 1 - vent by mask    Final Airway Details  Final airway type: endotracheal airway      Successful airway: ETT  Cuffed: yes   Successful intubation technique: direct laryngoscopy  Facilitating devices/methods: intubating stylet  Endotracheal tube insertion site: oral  Blade: Maddi  Blade size: 3  ETT size (mm): 7.0  Cormack-Lehane Classification: grade I - full view of glottis  Placement verified by: chest auscultation, capnometry and palpation of cuff   Measured from: lips  ETT/EBT  to lips (cm): 21  Number of attempts at approach: 1  Assessment: lips, teeth, and gum same as pre-op and atraumatic intubation

## 2022-12-29 VITALS
WEIGHT: 134 LBS | DIASTOLIC BLOOD PRESSURE: 59 MMHG | BODY MASS INDEX: 22.88 KG/M2 | OXYGEN SATURATION: 97 % | HEART RATE: 95 BPM | HEIGHT: 64 IN | TEMPERATURE: 98.7 F | RESPIRATION RATE: 18 BRPM | SYSTOLIC BLOOD PRESSURE: 106 MMHG

## 2022-12-29 LAB
DEPRECATED RDW RBC AUTO: 42.9 FL (ref 37–54)
ERYTHROCYTE [DISTWIDTH] IN BLOOD BY AUTOMATED COUNT: 13.2 % (ref 12.3–15.4)
HCT VFR BLD AUTO: 34.8 % (ref 34–46.6)
HGB BLD-MCNC: 10.9 G/DL (ref 12–15.9)
MCH RBC QN AUTO: 27.9 PG (ref 26.6–33)
MCHC RBC AUTO-ENTMCNC: 31.3 G/DL (ref 31.5–35.7)
MCV RBC AUTO: 89 FL (ref 79–97)
PLATELET # BLD AUTO: 246 10*3/MM3 (ref 140–450)
PMV BLD AUTO: 11.7 FL (ref 6–12)
RBC # BLD AUTO: 3.91 10*6/MM3 (ref 3.77–5.28)
WBC NRBC COR # BLD: 14.75 10*3/MM3 (ref 3.4–10.8)

## 2022-12-29 PROCEDURE — G0378 HOSPITAL OBSERVATION PER HR: HCPCS

## 2022-12-29 PROCEDURE — 85027 COMPLETE CBC AUTOMATED: CPT | Performed by: OBSTETRICS & GYNECOLOGY

## 2022-12-29 RX ORDER — OXYCODONE HYDROCHLORIDE AND ACETAMINOPHEN 5; 325 MG/1; MG/1
1 TABLET ORAL EVERY 4 HOURS PRN
Qty: 8 TABLET | Refills: 0 | Status: SHIPPED | OUTPATIENT
Start: 2022-12-29 | End: 2023-03-10

## 2022-12-29 RX ORDER — DOCUSATE CALCIUM 240 MG
240 CAPSULE ORAL DAILY
Qty: 30 CAPSULE | Refills: 1 | Status: SHIPPED | OUTPATIENT
Start: 2022-12-29 | End: 2023-03-10

## 2022-12-29 RX ORDER — IBUPROFEN 600 MG/1
600 TABLET ORAL EVERY 6 HOURS PRN
Qty: 30 TABLET | Refills: 0 | Status: SHIPPED | OUTPATIENT
Start: 2022-12-29 | End: 2023-01-28

## 2022-12-29 RX ADMIN — IBUPROFEN 800 MG: 800 TABLET, FILM COATED ORAL at 06:45

## 2022-12-29 NOTE — PLAN OF CARE
Problem: Adult Inpatient Plan of Care  Goal: Plan of Care Review  Outcome: Met  Goal: Patient-Specific Goal (Individualized)  Outcome: Met  Goal: Absence of Hospital-Acquired Illness or Injury  Outcome: Met  Goal: Optimal Comfort and Wellbeing  Outcome: Met  Goal: Readiness for Transition of Care  Outcome: Met     Problem: Bleeding (Hysterectomy)  Goal: Absence of Bleeding  Outcome: Met     Problem: Bowel Motility Impaired (Hysterectomy)  Goal: Effective Bowel Elimination  Outcome: Met     Problem: Infection (Hysterectomy)  Goal: Absence of Infection Signs and Symptoms  Outcome: Met     Problem: Pain (Hysterectomy)  Goal: Acceptable Pain Control  Outcome: Met     Problem: Postoperative Nausea and Vomiting (Hysterectomy)  Goal: Nausea and Vomiting Relief  Outcome: Met     Problem: Postoperative Urinary Retention (Hysterectomy)  Goal: Effective Urinary Elimination  Outcome: Met     Problem: Respiratory Compromise (Hysterectomy)  Goal: Effective Oxygenation and Ventilation  Outcome: Met     Problem: Surgical Site Infection  Goal: Absence of Infection Signs and Symptoms  Outcome: Met   Goal Outcome Evaluation:

## 2022-12-29 NOTE — DISCHARGE SUMMARY
Date of Discharge: 12/29/22     Discharge Diagnosis:   Fibroid uterus  Pelvic pain  Menorrhagia  Left ovarian cyst    Problem List:    Uterine leiomyoma      Presenting Problem/History of Present Illness  Uterine leiomyoma [D25.9]      Hospital Course  Patient is a 47 y.o. female presented with the above diagnosis and underwent elective surgery.  She did well.     Procedures Performed  Procedure(s):  TOTAL LAPAROSCOPIC HYSTERECTOMY POSSIBLE BILATERAL SALPINGectomy, left  OOPHORECTOMY WITH DAVINCI ROBOT       Consults:   Consults     No orders found for last 30 day(s).          Pertinent Test Results:    Condition on Discharge: Stable  Vital Signs  Temp:  [94.2 °F (34.6 °C)-98.8 °F (37.1 °C)] 98.6 °F (37 °C)  Heart Rate:  [] 77  Resp:  [13-19] 18  BP: ()/(43-92) 107/60    Discharge Disposition  Home or Self Care    Discharge Medications     Discharge Medications      New Medications      Instructions Start Date   docusate calcium 240 MG capsule  Commonly known as: SURFAK   240 mg, Oral, Daily      ibuprofen 600 MG tablet  Commonly known as: ADVIL,MOTRIN   600 mg, Oral, Every 6 Hours PRN      oxyCODONE-acetaminophen 5-325 MG per tablet  Commonly known as: Percocet   1 tablet, Oral, Every 4 Hours PRN             Discharge Diet       Activity at Discharge      Follow-up Appointments  No future appointments.        Time: Discharge 15 min

## 2022-12-29 NOTE — PROGRESS NOTES
Subjective    The patient has no complaints.  Tolerating oral intake.  Pain is controlled. +ambulation.               Objective     /60 (BP Location: Left arm)   Pulse 77   Temp 98.6 °F (37 °C) (Oral)   Resp 18   Ht 162.6 cm (64\")   Wt 60.8 kg (134 lb)   LMP 11/14/2022   SpO2 98%   BMI 23.00 kg/m²   General:  alert, appears stated age and cooperative   Abdomen: soft, bowel sounds active, non-tender   Incision:   healing well, no drainage, no erythema, no hernia, no seroma, no swelling, no dehiscence, incision well approximated         Assessment      {doing well:53165::\"Doing well postoperatively.\"     Plan     1. Continue any current medications.  2. Wound care discussed.

## 2022-12-29 NOTE — PLAN OF CARE
Goal Outcome Evaluation:  Plan of Care Reviewed With: patient   VSS; Pt tolerating po intake, voiding without difficulty and ambulating in hallway. Pt states pain/discomfort eased with scheduled motrin.  4 lap incisions to abd, cl, dry and intact. Pt voices no concerns or issues at this time.     Progress: improving

## 2022-12-30 LAB — REF LAB TEST METHOD: NORMAL

## 2023-03-10 ENCOUNTER — OFFICE VISIT (OUTPATIENT)
Dept: FAMILY MEDICINE CLINIC | Facility: CLINIC | Age: 48
End: 2023-03-10
Payer: COMMERCIAL

## 2023-03-10 VITALS
WEIGHT: 132.4 LBS | SYSTOLIC BLOOD PRESSURE: 130 MMHG | HEIGHT: 64 IN | DIASTOLIC BLOOD PRESSURE: 80 MMHG | TEMPERATURE: 97.8 F | HEART RATE: 122 BPM | OXYGEN SATURATION: 97 % | BODY MASS INDEX: 22.61 KG/M2

## 2023-03-10 DIAGNOSIS — Z76.89 ENCOUNTER TO ESTABLISH CARE: Primary | ICD-10-CM

## 2023-03-10 DIAGNOSIS — Z00.00 ANNUAL PHYSICAL EXAM: ICD-10-CM

## 2023-03-10 DIAGNOSIS — Z12.11 COLON CANCER SCREENING: ICD-10-CM

## 2023-03-10 PROCEDURE — 99386 PREV VISIT NEW AGE 40-64: CPT | Performed by: FAMILY MEDICINE

## 2023-03-10 NOTE — PROGRESS NOTES
Subjective   Indira Greco is a 47 y.o. female.   Pt presents today with [facility]      History of Present Illness   History of Present Illness  Patient is a 47-year-old female here to [facility].  She has a gynecologist that takes care of her women's health concerns.  She had a hysterectomy last year, no concerns with this.  She is up-to-date on mammogram.  She is agreeable to Cologuard, she is willing to go for colonoscopy if Cologuard returns positive.  She is not having any symptoms.  She is here for annual physical for new employment.  She is up-to-date on tuberculosis screening.  She has no symptoms of tuberculosis.       The following portions of the patient's history were reviewed and updated as appropriate: allergies, current medications, past family history, past medical history, past social history, past surgical history and problem list.    Review of Systems   Constitutional: Negative for chills, fever and unexpected weight loss.   HENT: Negative for congestion and sore throat.    Eyes: Negative for blurred vision and visual disturbance.   Respiratory: Negative for cough and wheezing.    Cardiovascular: Negative for chest pain and palpitations.   Gastrointestinal: Negative for abdominal pain and diarrhea.   Endocrine: Negative for cold intolerance and heat intolerance.   Genitourinary: Negative for dysuria.   Musculoskeletal: Negative for arthralgias and neck stiffness.   Neurological: Negative for dizziness, seizures and syncope.   Psychiatric/Behavioral: Negative for self-injury, suicidal ideas and depressed mood.       Objective   Physical Exam  Vitals and nursing note reviewed.   Constitutional:       Appearance: She is well-developed.   HENT:      Head: Normocephalic and atraumatic.      Right Ear: External ear normal.      Left Ear: External ear normal.      Nose: Nose normal.   Eyes:      Conjunctiva/sclera: Conjunctivae normal.      Pupils: Pupils are equal, round, and reactive to  light.   Cardiovascular:      Rate and Rhythm: Normal rate and regular rhythm.      Heart sounds: Normal heart sounds.   Pulmonary:      Effort: Pulmonary effort is normal.      Breath sounds: Normal breath sounds.   Abdominal:      General: Bowel sounds are normal.      Palpations: Abdomen is soft.   Musculoskeletal:      Cervical back: Normal range of motion and neck supple.   Skin:     General: Skin is warm and dry.   Neurological:      Mental Status: She is alert and oriented to person, place, and time.   Psychiatric:         Behavior: Behavior normal.           Assessment & Plan   Diagnoses and all orders for this visit:    1. Encounter to establish care (Primary)    2. Colon cancer screening  -     Cologuard - Stool, Per Rectum; Future    3. Annual physical exam  -     Comprehensive metabolic panel; Future  -     Lipid panel; Future  -     CBC No Differential; Future    Patient Counseling:  --Nutrition: Stressed importance of moderation in sodium/caffeine intake, saturated fat and cholesterol, caloric balance, sufficient intake of fresh fruits, vegetables, fiber, calcium, iron, and 1 mg of folate supplement per day (for females capable of pregnancy).  --Discussed the issue of estrogen replacement, calcium supplement, and the daily use of baby aspirin.  --Exercise: Stressed the importance of regular exercise.   --Substance Abuse: Discussed cessation/primary prevention of tobacco, alcohol, or other drug use; driving or other dangerous activities under the influence; availability of treatment for abuse.    --Sexuality: Discussed sexually transmitted diseases, partner selection, use of condoms, avoidance of unintended pregnancy  and contraceptive alternatives.   --Injury prevention: Discussed safety belts, safety helmets, smoke detector, smoking near bedding or upholstery.   --Dental health: Discussed importance of regular tooth brushing, flossing, and dental visits.  --Immunizations reviewed.  --Discussed  benefits of screening colonoscopy.  --After hours service discussed with patient             Indira Greco  reports that she has never smoked. She has never used smokeless tobacco.. I have educated her on the risk of diseases from using tobacco products such as cancer, COPD and heart disease.     I advised her to quit and she is not willing to quit.    I spent 3  minutes counseling the patient.         BMI is within normal parameters. No other follow-up for BMI required.          This document has been electronically signed by Quinn Balbuena DO  March 10, 2023 15:24 EST    Dictated Utilizing Dragon Dictation: Part of this note may be an electronic transcription/translation of spoken language to printed text using the Dragon Dictation System.

## 2023-03-17 ENCOUNTER — LAB (OUTPATIENT)
Dept: FAMILY MEDICINE CLINIC | Facility: CLINIC | Age: 48
End: 2023-03-17
Payer: COMMERCIAL

## 2023-03-17 DIAGNOSIS — Z00.00 ANNUAL PHYSICAL EXAM: ICD-10-CM

## 2023-03-17 PROCEDURE — 85027 COMPLETE CBC AUTOMATED: CPT | Performed by: FAMILY MEDICINE

## 2023-03-17 PROCEDURE — 80053 COMPREHEN METABOLIC PANEL: CPT | Performed by: FAMILY MEDICINE

## 2023-03-17 PROCEDURE — 80061 LIPID PANEL: CPT | Performed by: FAMILY MEDICINE

## 2023-03-17 PROCEDURE — 36415 COLL VENOUS BLD VENIPUNCTURE: CPT

## 2023-03-18 LAB
ALBUMIN SERPL-MCNC: 4.7 G/DL (ref 3.5–5.2)
ALBUMIN/GLOB SERPL: 1.6 G/DL
ALP SERPL-CCNC: 75 U/L (ref 39–117)
ALT SERPL W P-5'-P-CCNC: 11 U/L (ref 1–33)
ANION GAP SERPL CALCULATED.3IONS-SCNC: 10 MMOL/L (ref 5–15)
AST SERPL-CCNC: 17 U/L (ref 1–32)
BILIRUB SERPL-MCNC: 0.2 MG/DL (ref 0–1.2)
BUN SERPL-MCNC: 12 MG/DL (ref 6–20)
BUN/CREAT SERPL: 15.4 (ref 7–25)
CALCIUM SPEC-SCNC: 9.7 MG/DL (ref 8.6–10.5)
CHLORIDE SERPL-SCNC: 105 MMOL/L (ref 98–107)
CHOLEST SERPL-MCNC: 211 MG/DL (ref 0–200)
CO2 SERPL-SCNC: 26 MMOL/L (ref 22–29)
CREAT SERPL-MCNC: 0.78 MG/DL (ref 0.57–1)
DEPRECATED RDW RBC AUTO: 39.9 FL (ref 37–54)
EGFRCR SERPLBLD CKD-EPI 2021: 93.8 ML/MIN/1.73
ERYTHROCYTE [DISTWIDTH] IN BLOOD BY AUTOMATED COUNT: 12.8 % (ref 12.3–15.4)
GLOBULIN UR ELPH-MCNC: 2.9 GM/DL
GLUCOSE SERPL-MCNC: 96 MG/DL (ref 65–99)
HCT VFR BLD AUTO: 39.2 % (ref 34–46.6)
HDLC SERPL-MCNC: 83 MG/DL (ref 40–60)
HGB BLD-MCNC: 12.4 G/DL (ref 12–15.9)
LDLC SERPL CALC-MCNC: 116 MG/DL (ref 0–100)
LDLC/HDLC SERPL: 1.38 {RATIO}
MCH RBC QN AUTO: 27.1 PG (ref 26.6–33)
MCHC RBC AUTO-ENTMCNC: 31.6 G/DL (ref 31.5–35.7)
MCV RBC AUTO: 85.6 FL (ref 79–97)
PLATELET # BLD AUTO: 303 10*3/MM3 (ref 140–450)
PMV BLD AUTO: 12.5 FL (ref 6–12)
POTASSIUM SERPL-SCNC: 4.3 MMOL/L (ref 3.5–5.2)
PROT SERPL-MCNC: 7.6 G/DL (ref 6–8.5)
RBC # BLD AUTO: 4.58 10*6/MM3 (ref 3.77–5.28)
SODIUM SERPL-SCNC: 141 MMOL/L (ref 136–145)
TRIGL SERPL-MCNC: 66 MG/DL (ref 0–150)
VLDLC SERPL-MCNC: 12 MG/DL (ref 5–40)
WBC NRBC COR # BLD: 6.75 10*3/MM3 (ref 3.4–10.8)

## 2023-03-22 ENCOUNTER — TELEPHONE (OUTPATIENT)
Dept: FAMILY MEDICINE CLINIC | Facility: CLINIC | Age: 48
End: 2023-03-22
Payer: COMMERCIAL

## 2023-03-22 NOTE — TELEPHONE ENCOUNTER
----- Message from Quinn Balbuena DO sent at 3/22/2023 11:13 AM EDT -----  Blood work is stable.  No concerns        Patient notified& verbalized understanding.

## 2024-02-16 ENCOUNTER — TRANSCRIBE ORDERS (OUTPATIENT)
Dept: ADMINISTRATIVE | Facility: HOSPITAL | Age: 49
End: 2024-02-16
Payer: COMMERCIAL

## 2024-02-16 DIAGNOSIS — Z12.31 VISIT FOR SCREENING MAMMOGRAM: Primary | ICD-10-CM

## 2024-03-25 ENCOUNTER — TELEPHONE (OUTPATIENT)
Dept: FAMILY MEDICINE CLINIC | Facility: CLINIC | Age: 49
End: 2024-03-25
Payer: COMMERCIAL

## 2024-03-25 NOTE — TELEPHONE ENCOUNTER
Called patient to see if she would like to reschedule her mammogram appointment that she missed.

## 2024-05-06 ENCOUNTER — TELEPHONE (OUTPATIENT)
Dept: FAMILY MEDICINE CLINIC | Facility: CLINIC | Age: 49
End: 2024-05-06
Payer: COMMERCIAL

## 2024-07-29 ENCOUNTER — HOSPITAL ENCOUNTER (OUTPATIENT)
Dept: MAMMOGRAPHY | Facility: HOSPITAL | Age: 49
Discharge: HOME | End: 2024-07-29
Admitting: FAMILY MEDICINE
Payer: COMMERCIAL

## 2024-07-29 DIAGNOSIS — Z12.31 VISIT FOR SCREENING MAMMOGRAM: ICD-10-CM

## 2024-07-29 PROCEDURE — 77063 BREAST TOMOSYNTHESIS BI: CPT

## 2024-07-29 PROCEDURE — 77067 SCR MAMMO BI INCL CAD: CPT | Performed by: RADIOLOGY

## 2024-07-29 PROCEDURE — 77063 BREAST TOMOSYNTHESIS BI: CPT | Performed by: RADIOLOGY

## 2024-07-29 PROCEDURE — 77067 SCR MAMMO BI INCL CAD: CPT

## (undated) DEVICE — MANIP UTER ADVINCULA DELINEATOR 3.5CM

## (undated) DEVICE — 40595 XL TRENDELENBURG POSITIONING KIT: Brand: 40595 XL TRENDELENBURG POSITIONING KIT

## (undated) DEVICE — APPL CHLORAPREP HI/LITE 26ML ORNG

## (undated) DEVICE — VESSEL SEALER EXTEND: Brand: ENDOWRIST

## (undated) DEVICE — SUT MNCRYL 4/0 PS2 18 IN

## (undated) DEVICE — DRAPE,UTILTY,TAPE,15X26, 4EA/PK: Brand: MEDLINE

## (undated) DEVICE — ENDOPATH XCEL BLADELESS TROCARS WITH STABILITY SLEEVES: Brand: ENDOPATH XCEL

## (undated) DEVICE — 2, DISPOSABLE SUCTION/IRRIGATOR WITH DISPOSABLE TIP: Brand: STRYKEFLOW

## (undated) DEVICE — MONOPOLAR CURVED SCISSORS: Brand: ENDOWRIST

## (undated) DEVICE — ST TBG PNEUMOCLEAR EVAC SMOKE HIFLO

## (undated) DEVICE — CANNULA SEAL

## (undated) DEVICE — PROGRASP FORCEPS: Brand: ENDOWRIST

## (undated) DEVICE — MEGA SUTURECUT ND: Brand: ENDOWRIST

## (undated) DEVICE — TBG PENCL TELESCP MEGADYNE SMOKE EVAC 10FT

## (undated) DEVICE — BLADELESS OBTURATOR: Brand: WECK VISTA

## (undated) DEVICE — GLV SURG PREMIERPRO MIC LTX PF SZ8 BRN

## (undated) DEVICE — ANTIBACTERIAL VIOLET BRAIDED (POLYGLACTIN 910), SYNTHETIC ABSORBABLE SURGICAL SUTURE: Brand: COATED VICRYL

## (undated) DEVICE — ANTIBACTERIAL UNDYED BRAIDED (POLYGLACTIN 910), SYNTHETIC ABSORBABLE SUTURE: Brand: COATED VICRYL

## (undated) DEVICE — PATIENT RETURN ELECTRODE, SINGLE-USE, CONTACT QUALITY MONITORING, ADULT, WITH 9FT CORD, FOR PATIENTS WEIGING OVER 33LBS. (15KG): Brand: MEGADYNE

## (undated) DEVICE — PK DAVINCI 70

## (undated) DEVICE — ARM DRAPE

## (undated) DEVICE — TIP COVER ACCESSORY

## (undated) DEVICE — COVER,MAYO STAND,STERILE: Brand: MEDLINE

## (undated) DEVICE — ADHS SKIN PREMIERPRO EXOFIN TOPICAL HI/VISC .5ML